# Patient Record
Sex: MALE | Race: WHITE | NOT HISPANIC OR LATINO
[De-identification: names, ages, dates, MRNs, and addresses within clinical notes are randomized per-mention and may not be internally consistent; named-entity substitution may affect disease eponyms.]

---

## 2018-10-01 PROBLEM — Z00.00 ENCOUNTER FOR PREVENTIVE HEALTH EXAMINATION: Status: ACTIVE | Noted: 2018-10-01

## 2018-10-17 ENCOUNTER — APPOINTMENT (OUTPATIENT)
Dept: NEPHROLOGY | Facility: CLINIC | Age: 53
End: 2018-10-17

## 2019-01-29 ENCOUNTER — APPOINTMENT (OUTPATIENT)
Dept: NEPHROLOGY | Facility: CLINIC | Age: 54
End: 2019-01-29
Payer: COMMERCIAL

## 2019-01-29 ENCOUNTER — LABORATORY RESULT (OUTPATIENT)
Age: 54
End: 2019-01-29

## 2019-01-29 VITALS
HEIGHT: 70 IN | DIASTOLIC BLOOD PRESSURE: 102 MMHG | HEART RATE: 102 BPM | SYSTOLIC BLOOD PRESSURE: 150 MMHG | WEIGHT: 208 LBS | BODY MASS INDEX: 29.78 KG/M2 | OXYGEN SATURATION: 95 %

## 2019-01-29 DIAGNOSIS — Z86.39 PERSONAL HISTORY OF OTHER ENDOCRINE, NUTRITIONAL AND METABOLIC DISEASE: ICD-10-CM

## 2019-01-29 DIAGNOSIS — N17.9 ACUTE KIDNEY FAILURE, UNSPECIFIED: ICD-10-CM

## 2019-01-29 DIAGNOSIS — R79.89 OTHER SPECIFIED ABNORMAL FINDINGS OF BLOOD CHEMISTRY: ICD-10-CM

## 2019-01-29 DIAGNOSIS — Z87.898 PERSONAL HISTORY OF OTHER SPECIFIED CONDITIONS: ICD-10-CM

## 2019-01-29 PROCEDURE — 99245 OFF/OP CONSLTJ NEW/EST HI 55: CPT

## 2019-01-29 RX ORDER — AMLODIPINE BESYLATE 5 MG/1
5 TABLET ORAL
Qty: 30 | Refills: 0 | Status: DISCONTINUED | COMMUNITY
Start: 2018-08-28

## 2019-01-29 RX ORDER — RAMIPRIL 10 MG/1
10 CAPSULE ORAL
Qty: 30 | Refills: 0 | Status: ACTIVE | COMMUNITY
Start: 2018-08-28

## 2019-01-29 RX ORDER — MECLIZINE HYDROCHLORIDE 12.5 MG/1
12.5 TABLET ORAL
Qty: 30 | Refills: 0 | Status: ACTIVE | COMMUNITY
Start: 2018-11-16

## 2019-01-29 RX ORDER — AMLODIPINE BESYLATE 10 MG/1
10 TABLET ORAL
Qty: 90 | Refills: 0 | Status: ACTIVE | COMMUNITY
Start: 2018-10-01

## 2019-01-29 RX ORDER — ROSUVASTATIN CALCIUM 20 MG/1
20 TABLET, FILM COATED ORAL
Qty: 30 | Refills: 0 | Status: ACTIVE | COMMUNITY
Start: 2018-08-28

## 2019-01-29 NOTE — PHYSICAL EXAM
[General Appearance - Alert] : alert [General Appearance - In No Acute Distress] : in no acute distress [General Appearance - Well Nourished] : well nourished [General Appearance - Well Developed] : well developed [General Appearance - Well-Appearing] : healthy appearing [Sclera] : the sclera and conjunctiva were normal [Outer Ear] : the ears and nose were normal in appearance [Hearing Threshold Finger Rub Not [place]] : hearing was normal [Neck Appearance] : the appearance of the neck was normal [Exaggerated Use Of Accessory Muscles For Inspiration] : no accessory muscle use [Auscultation Breath Sounds / Voice Sounds] : lungs were clear to auscultation bilaterally [Apical Impulse] : the apical impulse was normal [Heart Sounds] : normal S1 and S2 [Heart Sounds Pericardial Friction Rub] : no pericardial rub [Edema] : there was no peripheral edema [Abdomen Soft] : soft [No CVA Tenderness] : no ~M costovertebral angle tenderness [Abnormal Walk] : normal gait [] : no rash [No Focal Deficits] : no focal deficits [Oriented To Time, Place, And Person] : oriented to person, place, and time [Impaired Insight] : insight and judgment were intact [Affect] : the affect was normal [Mood] : the mood was normal

## 2019-01-29 NOTE — HISTORY OF PRESENT ILLNESS
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 \par \par Pt. states that he was initially made aware of having blood and protein in the urine in 2000, and saw nephrologist Dr. Paris at that time. He refused to undergo kidney biopsy at that time and was advised to have good BP control. He never followed up with Dr. Paris after that. \par \par Pt. also states that he was told by his PCP about elevation in his Scr ~a month ago and thinks that the elevation was due to poorly controlled BP. He mentions to have had some LE swelling around Christmas, 2018 that resolved on its own. Denies any known history of nephrolithiasis or hepatitis. He also denies use of any NSAIDs or herbal supplements.

## 2019-01-29 NOTE — CONSULT LETTER
[Dear  ___] : Dear  [unfilled], [Consult Letter:] : I had the pleasure of evaluating your patient, [unfilled]. [( Thank you for referring [unfilled] for consultation for _____ )] : Thank you for referring [unfilled] for consultation for [unfilled] [Please see my note below.] : Please see my note below. [Consult Closing:] : Thank you very much for allowing me to participate in the care of this patient.  If you have any questions, please do not hesitate to contact me. [Sincerely,] : Sincerely, [FreeTextEntry3] : Roxie Beckham MD

## 2019-01-30 LAB
APPEARANCE: CLEAR
BACTERIA: NEGATIVE
BILIRUBIN URINE: NEGATIVE
BLOOD URINE: ABNORMAL
COLOR: YELLOW
GLUCOSE QUALITATIVE U: NEGATIVE MG/DL
HYALINE CASTS: 7 /LPF
KETONES URINE: NEGATIVE
LEUKOCYTE ESTERASE URINE: NEGATIVE
MICROSCOPIC-UA: NORMAL
NITRITE URINE: NEGATIVE
PH URINE: 5.5
PROTEIN URINE: 300 MG/DL
RED BLOOD CELLS URINE: 1 /HPF
SPECIFIC GRAVITY URINE: 1.02
SQUAMOUS EPITHELIAL CELLS: 1 /HPF
UROBILINOGEN URINE: NEGATIVE MG/DL
WHITE BLOOD CELLS URINE: 1 /HPF

## 2019-01-31 LAB
ALBUMIN SERPL ELPH-MCNC: 4.3 G/DL
ANA PAT FLD IF-IMP: ABNORMAL
ANA SER IF-ACNC: ABNORMAL
ANION GAP SERPL CALC-SCNC: 13 MMOL/L
APTT BLD: 31.3 SEC
BASOPHILS # BLD AUTO: 0.05 K/UL
BASOPHILS NFR BLD AUTO: 0.4 %
BUN SERPL-MCNC: 32 MG/DL
C3 SERPL-MCNC: 132 MG/DL
C4 SERPL-MCNC: 42 MG/DL
CALCIUM SERPL-MCNC: 9.3 MG/DL
CHLORIDE SERPL-SCNC: 107 MMOL/L
CO2 SERPL-SCNC: 22 MMOL/L
CREAT SERPL-MCNC: 1.76 MG/DL
CREAT SPEC-SCNC: 119 MG/DL
CREAT/PROT UR: 3.2 RATIO
DEPRECATED KAPPA LC FREE/LAMBDA SER: 0.86 RATIO
EOSINOPHIL # BLD AUTO: 0.29 K/UL
EOSINOPHIL NFR BLD AUTO: 2.6 %
GLUCOSE SERPL-MCNC: 105 MG/DL
HBA1C MFR BLD HPLC: 5.5 %
HBV SURFACE AG SER QL: NONREACTIVE
HCT VFR BLD CALC: 45.3 %
HCV AB SER QL: NONREACTIVE
HCV S/CO RATIO: 0.12 S/CO
HGB BLD-MCNC: 15.2 G/DL
IMM GRANULOCYTES NFR BLD AUTO: 0.2 %
INR PPP: 0.91 RATIO
KAPPA LC CSF-MCNC: 2.63 MG/DL
KAPPA LC SERPL-MCNC: 2.26 MG/DL
LYMPHOCYTES # BLD AUTO: 2.07 K/UL
LYMPHOCYTES NFR BLD AUTO: 18.5 %
MAN DIFF?: NORMAL
MCHC RBC-ENTMCNC: 29.8 PG
MCHC RBC-ENTMCNC: 33.6 GM/DL
MCV RBC AUTO: 88.8 FL
MONOCYTES # BLD AUTO: 0.71 K/UL
MONOCYTES NFR BLD AUTO: 6.4 %
MPO AB + PR3 PNL SER: NORMAL
NEUTROPHILS # BLD AUTO: 8.02 K/UL
NEUTROPHILS NFR BLD AUTO: 71.9 %
PHOSPHATE SERPL-MCNC: 3.6 MG/DL
PLATELET # BLD AUTO: 211 K/UL
POTASSIUM SERPL-SCNC: 4.6 MMOL/L
PROT UR-MCNC: 387 MG/DL
PT BLD: 10.3 SEC
RBC # BLD: 5.1 M/UL
RBC # FLD: 13.1 %
SODIUM SERPL-SCNC: 142 MMOL/L
WBC # FLD AUTO: 11.16 K/UL

## 2019-02-04 ENCOUNTER — FORM ENCOUNTER (OUTPATIENT)
Age: 54
End: 2019-02-04

## 2019-02-04 LAB
GBM AB TITR SER IF: <1 AL
M PROTEIN SPEC IFE-MCNC: NORMAL
PHOSPHOLIPASE A2 RECEPTOR ELISA: <2 RU/ML
PHOSPHOLIPASE A2 RECEPTOR IFA: NEGATIVE

## 2019-02-05 ENCOUNTER — OTHER (OUTPATIENT)
Age: 54
End: 2019-02-05

## 2019-02-05 ENCOUNTER — APPOINTMENT (OUTPATIENT)
Dept: ULTRASOUND IMAGING | Facility: CLINIC | Age: 54
End: 2019-02-05
Payer: COMMERCIAL

## 2019-02-05 ENCOUNTER — OUTPATIENT (OUTPATIENT)
Dept: OUTPATIENT SERVICES | Facility: HOSPITAL | Age: 54
LOS: 1 days | End: 2019-02-05
Payer: COMMERCIAL

## 2019-02-05 DIAGNOSIS — R80.9 PROTEINURIA, UNSPECIFIED: ICD-10-CM

## 2019-02-05 PROCEDURE — 76775 US EXAM ABDO BACK WALL LIM: CPT

## 2019-02-05 PROCEDURE — 76775 US EXAM ABDO BACK WALL LIM: CPT | Mod: 26

## 2019-03-07 ENCOUNTER — FORM ENCOUNTER (OUTPATIENT)
Age: 54
End: 2019-03-07

## 2019-03-08 ENCOUNTER — APPOINTMENT (OUTPATIENT)
Dept: ULTRASOUND IMAGING | Facility: HOSPITAL | Age: 54
End: 2019-03-08
Payer: COMMERCIAL

## 2019-03-08 ENCOUNTER — OUTPATIENT (OUTPATIENT)
Dept: OUTPATIENT SERVICES | Facility: HOSPITAL | Age: 54
LOS: 1 days | End: 2019-03-08
Payer: COMMERCIAL

## 2019-03-08 ENCOUNTER — RESULT REVIEW (OUTPATIENT)
Age: 54
End: 2019-03-08

## 2019-03-08 DIAGNOSIS — R80.9 PROTEINURIA, UNSPECIFIED: ICD-10-CM

## 2019-03-08 DIAGNOSIS — Z00.00 ENCOUNTER FOR GENERAL ADULT MEDICAL EXAMINATION WITHOUT ABNORMAL FINDINGS: ICD-10-CM

## 2019-03-08 PROCEDURE — 88348 ELECTRON MICROSCOPY DX: CPT | Mod: 26

## 2019-03-08 PROCEDURE — 88350 IMFLUOR EA ADDL 1ANTB STN PX: CPT

## 2019-03-08 PROCEDURE — 88346 IMFLUOR 1ST 1ANTB STAIN PX: CPT | Mod: 26

## 2019-03-08 PROCEDURE — 50200 RENAL BIOPSY PERQ: CPT | Mod: LT

## 2019-03-08 PROCEDURE — 88346 IMFLUOR 1ST 1ANTB STAIN PX: CPT

## 2019-03-08 PROCEDURE — 88313 SPECIAL STAINS GROUP 2: CPT | Mod: 26

## 2019-03-08 PROCEDURE — 50200 RENAL BIOPSY PERQ: CPT

## 2019-03-08 PROCEDURE — 88350 IMFLUOR EA ADDL 1ANTB STN PX: CPT | Mod: 26

## 2019-03-08 PROCEDURE — 76942 ECHO GUIDE FOR BIOPSY: CPT | Mod: 26

## 2019-03-08 PROCEDURE — 88305 TISSUE EXAM BY PATHOLOGIST: CPT | Mod: 26

## 2019-03-08 PROCEDURE — 88312 SPECIAL STAINS GROUP 1: CPT

## 2019-03-08 PROCEDURE — 85027 COMPLETE CBC AUTOMATED: CPT

## 2019-03-08 PROCEDURE — 88312 SPECIAL STAINS GROUP 1: CPT | Mod: 26

## 2019-03-08 PROCEDURE — 88313 SPECIAL STAINS GROUP 2: CPT

## 2019-03-08 PROCEDURE — 76942 ECHO GUIDE FOR BIOPSY: CPT

## 2019-03-08 PROCEDURE — 88305 TISSUE EXAM BY PATHOLOGIST: CPT

## 2019-03-08 PROCEDURE — 85730 THROMBOPLASTIN TIME PARTIAL: CPT

## 2019-03-08 PROCEDURE — 88348 ELECTRON MICROSCOPY DX: CPT

## 2019-03-08 PROCEDURE — 85610 PROTHROMBIN TIME: CPT

## 2019-03-22 ENCOUNTER — APPOINTMENT (OUTPATIENT)
Dept: NEPHROLOGY | Facility: CLINIC | Age: 54
End: 2019-03-22

## 2019-04-24 PROBLEM — N18.3 STAGE 3 CHRONIC KIDNEY DISEASE: Status: ACTIVE | Noted: 2019-01-29

## 2019-04-25 ENCOUNTER — APPOINTMENT (OUTPATIENT)
Dept: NEPHROLOGY | Facility: CLINIC | Age: 54
End: 2019-04-25
Payer: COMMERCIAL

## 2019-04-25 VITALS
DIASTOLIC BLOOD PRESSURE: 109 MMHG | WEIGHT: 207.23 LBS | HEIGHT: 70 IN | BODY MASS INDEX: 29.67 KG/M2 | SYSTOLIC BLOOD PRESSURE: 170 MMHG | OXYGEN SATURATION: 98 % | HEART RATE: 93 BPM

## 2019-04-25 VITALS — DIASTOLIC BLOOD PRESSURE: 100 MMHG | SYSTOLIC BLOOD PRESSURE: 166 MMHG

## 2019-04-25 DIAGNOSIS — R80.9 PROTEINURIA, UNSPECIFIED: ICD-10-CM

## 2019-04-25 DIAGNOSIS — E87.5 HYPERKALEMIA: ICD-10-CM

## 2019-04-25 DIAGNOSIS — N20.0 CALCULUS OF KIDNEY: ICD-10-CM

## 2019-04-25 DIAGNOSIS — I10 ESSENTIAL (PRIMARY) HYPERTENSION: ICD-10-CM

## 2019-04-25 DIAGNOSIS — N18.3 CHRONIC KIDNEY DISEASE, STAGE 3 (MODERATE): ICD-10-CM

## 2019-04-25 PROCEDURE — 99214 OFFICE O/P EST MOD 30 MIN: CPT

## 2019-04-25 NOTE — HISTORY OF PRESENT ILLNESS
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 \gayle \gayle Pt. was initially seen in Jan, 2019 and had kidney biopsy performed in Feb, 2109 that showed IgA nephropathy and urate crystals. \gayle \gayle Pt. currently feels well and offers no complaints. He mentions that his son and brother have also been found to have proteinuria. Denies use of any NSAIDs on a regular basis or herbal supplements. He states he drinks 6-8 bottes of water per day. No dysuria, burning micturition, or gross hematuria reported.

## 2019-04-25 NOTE — PHYSICAL EXAM
[General Appearance - Alert] : alert [General Appearance - In No Acute Distress] : in no acute distress [General Appearance - Well Developed] : well developed [General Appearance - Well Nourished] : well nourished [General Appearance - Well-Appearing] : healthy appearing [Sclera] : the sclera and conjunctiva were normal [Outer Ear] : the ears and nose were normal in appearance [Hearing Threshold Finger Rub Not [place]] : hearing was normal [Neck Appearance] : the appearance of the neck was normal [Exaggerated Use Of Accessory Muscles For Inspiration] : no accessory muscle use [Auscultation Breath Sounds / Voice Sounds] : lungs were clear to auscultation bilaterally [Apical Impulse] : the apical impulse was normal [Heart Sounds] : normal S1 and S2 [Heart Sounds Pericardial Friction Rub] : no pericardial rub [Edema] : there was no peripheral edema [Abdomen Soft] : soft [Abnormal Walk] : normal gait [No CVA Tenderness] : no ~M costovertebral angle tenderness [] : no rash [Oriented To Time, Place, And Person] : oriented to person, place, and time [No Focal Deficits] : no focal deficits [Affect] : the affect was normal [Impaired Insight] : insight and judgment were intact [Mood] : the mood was normal

## 2019-04-25 NOTE — ASSESSMENT
[FreeTextEntry1] : 1. Stage 3 CKD : Exact duration and etiology of elevated Scr unknown, pt. however noted to have elevated Scr of 1.37 even in Jan, 2016. Pt. then with increased Scr to 1.8 in Sept, 2018. Pt. with ?BRIDGETT in the setting of ramipril use and poorly controlled BP vs CKD progression. Last Scr stable. \par -Check renal panel\par -Avoid nephrotoxins, NSAIDs and RCA.\par \par 2. Proteinuria: Pt. with known proteinuria since 2000, that has been persistent. Kidney biopsy showed IgA nephropathy with urate crystal deposits. Currently on maximum dose of ramipril. Last spot urine TP/CR was 3.2.\par -Check UA and spot urine TP/CR\par -Low protein and low salt diet advised\par \par 3. HTN: BP elevated during today's office visit, pt. mentions that his BP was never under great control for past 20 years. Pt. however states that he has been checking BP regularly at home, and did not take his BP meds this am. Asymptomatic. Advised to have medication compliance, and monitor BP regularly at home. Low salt diet also advised. \par \par 4. Hyperkalemia: Noted to have hyperkalemia on labs done in Sept, 2018. Pt. with hyperkalemia in the setting of ramipril use and CKD. Last potassium level WNL. Check serum potassium today. Advised to take low potassium diet. \par \par 5. Nephrolithiasis: in the setting of urate crystals on kidney biopsy. Check serum uric acid. Advised to maintain fluid intake.\par \par \par Follow up in 1 month with Dr. Fabian.\par

## 2019-04-26 ENCOUNTER — RX RENEWAL (OUTPATIENT)
Age: 54
End: 2019-04-26

## 2019-04-26 ENCOUNTER — RESULT REVIEW (OUTPATIENT)
Age: 54
End: 2019-04-26

## 2019-04-26 LAB
ALBUMIN SERPL ELPH-MCNC: 4.2 G/DL
ANION GAP SERPL CALC-SCNC: 14 MMOL/L
APPEARANCE: CLEAR
BACTERIA: NEGATIVE
BILIRUBIN URINE: NEGATIVE
BLOOD URINE: ABNORMAL
BUN SERPL-MCNC: 29 MG/DL
CALCIUM SERPL-MCNC: 9.6 MG/DL
CHLORIDE SERPL-SCNC: 106 MMOL/L
CO2 SERPL-SCNC: 20 MMOL/L
COLOR: NORMAL
CREAT SERPL-MCNC: 1.82 MG/DL
CREAT SPEC-SCNC: 88 MG/DL
CREAT/PROT UR: 4.2 RATIO
GLUCOSE QUALITATIVE U: NEGATIVE
GLUCOSE SERPL-MCNC: 100 MG/DL
HYALINE CASTS: 1 /LPF
KETONES URINE: NEGATIVE
LEUKOCYTE ESTERASE URINE: NEGATIVE
MICROSCOPIC-UA: NORMAL
NITRITE URINE: NEGATIVE
PH URINE: 6
PHOSPHATE SERPL-MCNC: 3.1 MG/DL
POTASSIUM SERPL-SCNC: 4.6 MMOL/L
PROT UR-MCNC: 367 MG/DL
PROTEIN URINE: ABNORMAL
RED BLOOD CELLS URINE: 1 /HPF
SODIUM SERPL-SCNC: 140 MMOL/L
SPECIFIC GRAVITY URINE: 1.02
SQUAMOUS EPITHELIAL CELLS: 1 /HPF
URATE SERPL-MCNC: 7.5 MG/DL
UROBILINOGEN URINE: NORMAL
WHITE BLOOD CELLS URINE: 2 /HPF

## 2019-04-26 RX ORDER — LOSARTAN POTASSIUM 25 MG/1
25 TABLET, FILM COATED ORAL DAILY
Qty: 15 | Refills: 0 | Status: ACTIVE | COMMUNITY
Start: 2019-04-26 | End: 1900-01-01

## 2019-05-14 LAB
ALBUMIN SERPL ELPH-MCNC: 4.5 G/DL
ANION GAP SERPL CALC-SCNC: 11 MMOL/L
BUN SERPL-MCNC: 39 MG/DL
CALCIUM SERPL-MCNC: 9.4 MG/DL
CHLORIDE SERPL-SCNC: 106 MMOL/L
CO2 SERPL-SCNC: 20 MMOL/L
CREAT SERPL-MCNC: 2.1 MG/DL
GLUCOSE SERPL-MCNC: 84 MG/DL
PHOSPHATE SERPL-MCNC: 3 MG/DL
POTASSIUM SERPL-SCNC: 5 MMOL/L
SODIUM SERPL-SCNC: 137 MMOL/L

## 2019-05-23 ENCOUNTER — APPOINTMENT (OUTPATIENT)
Dept: NEPHROLOGY | Facility: CLINIC | Age: 54
End: 2019-05-23

## 2023-01-23 ENCOUNTER — APPOINTMENT (OUTPATIENT)
Dept: ORTHOPEDIC SURGERY | Facility: CLINIC | Age: 58
End: 2023-01-23